# Patient Record
Sex: MALE | Race: OTHER
[De-identification: names, ages, dates, MRNs, and addresses within clinical notes are randomized per-mention and may not be internally consistent; named-entity substitution may affect disease eponyms.]

---

## 2017-01-12 ENCOUNTER — HOSPITAL ENCOUNTER (EMERGENCY)
Dept: HOSPITAL 61 - ER | Age: 42
Discharge: HOME | End: 2017-01-12
Payer: COMMERCIAL

## 2017-01-12 VITALS — WEIGHT: 151 LBS | BODY MASS INDEX: 26.75 KG/M2 | HEIGHT: 63 IN

## 2017-01-12 VITALS — DIASTOLIC BLOOD PRESSURE: 94 MMHG | SYSTOLIC BLOOD PRESSURE: 143 MMHG

## 2017-01-12 DIAGNOSIS — J02.9: Primary | ICD-10-CM

## 2017-01-12 LAB
NEGATIVE OBC STREP: (no result)
POSITIVE OBC STREP: (no result)

## 2017-01-12 PROCEDURE — 87070 CULTURE OTHR SPECIMN AEROBIC: CPT

## 2017-01-12 PROCEDURE — 99283 EMERGENCY DEPT VISIT LOW MDM: CPT

## 2017-01-12 PROCEDURE — 87880 STREP A ASSAY W/OPTIC: CPT

## 2017-01-12 NOTE — PHYS DOC
Past Medical History


Past Medical History:  No Pertinent History


Past Surgical History:  Appendectomy


Alcohol Use:  Occasionally


Drug Use:  None





Adult General


Chief Complaint


Chief Complaint:  SORE THROAT





HPI


HPI


Patient is a 41  year old male who presents with sore throat. Patient reports 

pain started yesterday. He denies fever. He does have a cough. No nausea or 

vomiting. He has tried home remedies such as honey with insufficient relief. No 

other acute complaints.





Review of Systems


Review of Systems


Constitutional: Denies fever or chills 


HENT: Sore throat 


Respiratory: Cough. Denies shortness of breath 


Cardiovascular: Denies chest pain


GI: Denies abdominal pain, nausea, vomiting, bloody stools or diarrhea 


Integument: Denies rash or skin lesions 


Neurologic: Denies headache, focal weakness or sensory changes





Current Medications


Current Medications





 Current Medications








 Medications


  (Trade)  Dose


 Ordered  Sig/Rafia  Start Time


 Stop Time Status Last Admin


Dose Admin


 


 Naproxen


  (Naprosyn)  500 mg  1X  ONCE  1/12/17 02:00


 1/12/17 02:01 DC 1/12/17 02:33


500 MG











Allergies


Allergies





 Allergies








Coded Allergies Type Severity Reaction Last Updated Verified


 


  No Known Drug Allergies    1/12/17 No











Physical Exam


Physical Exam


Constitutional: Well developed, well nourished, no acute distress, non-toxic 

appearance 


HENT: Normocephalic, atraumatic, bilateral external ears normal. Oropharynx 

mildly erythematous without exudate; mild anterior cervical lymphadenopathy


Eyes: EOMI, conjunctiva normal, no discharge


Neck: Normal range of motion, no stridor


Cardiovascular: Heart rate normal, regular rhythm, no murmur 


Lungs & Thorax:  Bilateral breath sounds clear to auscultation


Abdomen: Bowel sounds normal, soft, non-distended, no TTP


Skin: Warm, dry, no erythema, no rash


Extremities: No obvious deformity, no edema


Neurologic: Alert and oriented X 3, no gross deficits noted





Current Patient Data


Vital Signs





 Vital Signs








  Date Time  Temp Pulse Resp B/P Pulse Ox O2 Delivery O2 Flow Rate FiO2


 


1/12/17 02:30  78  143/94    


 


1/12/17 01:38 98.7  16  95 Room Air  





 98.7       











EKG


EKG


[]





Radiology/Procedures


Radiology/Procedures


[]





Course & Med Decision Making


Course & Med Decision Making


Pertinent Labs and Imaging studies reviewed. (See chart for details)


Patient is 41-year-old male who presents with sore throat. Possible viral 

infection, given cough. Rapid strep ordered. Dose of naproxen given for pain 

control. Rapid strep negative. Discussed results with patient and . 

Patient discharged home with instructions for follow-up and return precautions.





Dimple Disclaimer


Dragon Disclaimer


This electronic medical record was generated, in whole or in part, using a 

voice recognition dictation system.





Departure


Departure


Impression:  


 Primary Impression:  


 Pharyngitis


Disposition:  01 HOME, SELF-CARE


Condition:  STABLE


Patient Instructions:  Viral Pharyngitis





Additional Instructions:


Thank you for allowing us to provide care today in the Emergency Department.


Take the provided medication as directed.


Schedule a follow up appointment with your primary care doctor. 


Return promptly to the Emergency Department if you develop any new or 

concerning symptoms.


Scripts


Naproxen 125 Mg/5 Ml Oral.dcfo356 Mg PO BID #120 ML


   Prov:ALPHONSE ADAMS MD         1/12/17








ALPHONSE ADAMS MD Jan 12, 2017 01:46

## 2018-05-22 ENCOUNTER — HOSPITAL ENCOUNTER (EMERGENCY)
Dept: HOSPITAL 61 - ER | Age: 43
Discharge: HOME | End: 2018-05-22
Payer: COMMERCIAL

## 2018-05-22 DIAGNOSIS — R07.2: Primary | ICD-10-CM

## 2018-05-22 DIAGNOSIS — F17.210: ICD-10-CM

## 2018-05-22 DIAGNOSIS — Z82.49: ICD-10-CM

## 2018-05-22 LAB
ADD MAN DIFF?: NO
ALBUMIN SERPL-MCNC: 3.9 G/DL (ref 3.4–5)
ALBUMIN/GLOB SERPL: 1 {RATIO} (ref 1–1.7)
ALP SERPL-CCNC: 97 U/L (ref 46–116)
ALT (SGPT): 70 U/L (ref 16–63)
ANION GAP SERPL CALC-SCNC: 7 MMOL/L (ref 6–14)
AST SERPL-CCNC: 39 U/L (ref 15–37)
BASO #: 0.1 X10^3/UL (ref 0–0.2)
BASO %: 1 % (ref 0–3)
BLOOD UREA NITROGEN: 10 MG/DL (ref 8–26)
BUN/CREAT SERPL: 13 (ref 6–20)
CALCIUM: 9 MG/DL (ref 8.5–10.1)
CHLORIDE: 103 MMOL/L (ref 98–107)
CO2 SERPL-SCNC: 27 MMOL/L (ref 21–32)
CREAT SERPL-MCNC: 0.8 MG/DL (ref 0.7–1.3)
EOS #: 0.2 X10^3/UL (ref 0–0.7)
EOS %: 2 % (ref 0–3)
GFR SERPLBLD BASED ON 1.73 SQ M-ARVRAT: 105 ML/MIN
GLOBULIN SER-MCNC: 3.9 G/DL (ref 2.2–3.8)
GLUCOSE SERPL-MCNC: 97 MG/DL (ref 70–99)
HCG SERPL-ACNC: 9.8 X10^3/UL (ref 4–11)
HEMATOCRIT: 43.6 % (ref 39–53)
HEMOGLOBIN: 14.9 G/DL (ref 13–17.5)
LYMPH #: 3.4 X10^3/UL (ref 1–4.8)
LYMPH %: 35 % (ref 24–48)
MEAN CORPUSCULAR HEMOGLOBIN: 30 PG (ref 25–35)
MEAN CORPUSCULAR HGB CONC: 34 G/DL (ref 31–37)
MEAN CORPUSCULAR VOLUME: 87 FL (ref 79–100)
MONO #: 0.7 X10^3/UL (ref 0–1.1)
MONO %: 8 % (ref 0–9)
NEUT #: 5.4 X10^3UL (ref 1.8–7.7)
NEUT %: 55 % (ref 31–73)
PLATELET COUNT: 195 X10^3/UL (ref 140–400)
POTASSIUM SERPL-SCNC: 4.2 MMOL/L (ref 3.5–5.1)
RED BLOOD COUNT: 5.01 X10^6/UL (ref 4.3–5.7)
RED CELL DISTRIBUTION WIDTH: 13.3 % (ref 11.5–14.5)
SODIUM: 137 MMOL/L (ref 136–145)
TOTAL BILIRUBIN: 0.6 MG/DL (ref 0.2–1)
TOTAL PROTEIN: 7.8 G/DL (ref 6.4–8.2)
TROPONINI: < 0.017 NG/ML (ref 0–0.06)

## 2018-05-22 PROCEDURE — 85025 COMPLETE CBC W/AUTO DIFF WBC: CPT

## 2018-05-22 PROCEDURE — 99285 EMERGENCY DEPT VISIT HI MDM: CPT

## 2018-05-22 PROCEDURE — 71046 X-RAY EXAM CHEST 2 VIEWS: CPT

## 2018-05-22 PROCEDURE — 93005 ELECTROCARDIOGRAM TRACING: CPT

## 2018-05-22 PROCEDURE — 80053 COMPREHEN METABOLIC PANEL: CPT

## 2018-05-22 PROCEDURE — 84484 ASSAY OF TROPONIN QUANT: CPT

## 2018-05-22 PROCEDURE — 36415 COLL VENOUS BLD VENIPUNCTURE: CPT

## 2021-04-08 ENCOUNTER — HOSPITAL ENCOUNTER (EMERGENCY)
Dept: HOSPITAL 61 - ER | Age: 46
Discharge: HOME | End: 2021-04-08
Payer: SELF-PAY

## 2021-04-08 VITALS — DIASTOLIC BLOOD PRESSURE: 91 MMHG | SYSTOLIC BLOOD PRESSURE: 145 MMHG

## 2021-04-08 VITALS — BODY MASS INDEX: 29.86 KG/M2 | HEIGHT: 62 IN | WEIGHT: 162.26 LBS

## 2021-04-08 DIAGNOSIS — Z90.89: ICD-10-CM

## 2021-04-08 DIAGNOSIS — M79.605: ICD-10-CM

## 2021-04-08 DIAGNOSIS — I10: Primary | ICD-10-CM

## 2021-04-08 DIAGNOSIS — M79.604: ICD-10-CM

## 2021-04-08 DIAGNOSIS — Z98.890: ICD-10-CM

## 2021-04-08 DIAGNOSIS — R60.0: ICD-10-CM

## 2021-04-08 PROCEDURE — 99285 EMERGENCY DEPT VISIT HI MDM: CPT

## 2021-04-08 PROCEDURE — 93970 EXTREMITY STUDY: CPT

## 2021-04-08 NOTE — PHYS DOC
Past Medical History


Past Medical History:  No Pertinent History, Hypertension


Past Surgical History:  Appendectomy, Other


Additional Past Surgical Histo:  kidney stones


Smoking Status:  Never Smoker


Alcohol Use:  Occasionally


Drug Use:  None





General Adult


EDM:


Chief Complaint:  LOWER EXTREMITY SWELLING





HPI:


HPI:





Patient is a 45  year old male with a history of hypertension not on any 

medication who presents to the ED today complaining of 5 out of 10 bilateral 

lower extremity pain specifically from the knee to the ankles, pain has been 

going on since yesterday.  Describes the pain as pulling.  States the pain is 

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 

.  Denies any chest pain or shortness of breath.  Denies any injuries.





Patient is Azeri speaking and interpretation was provided by the son





Review of Systems:


Review of Systems:


Constitutional:   Denies fever or chills. []


Eyes:   Denies change in visual acuity. []


HENT:   Denies nasal congestion or sore throat. [] 


Respiratory:   Denies cough or shortness of breath. [] 


Cardiovascular:   Denies chest pain or edema. [] 


GI:   Denies abdominal pain, nausea, vomiting, bloody stools or diarrhea. [] 


:  Denies dysuria. [] 


Musculoskeletal:   Reports bilateral lower extremity pain.  Denies back pain 


Integument:   Denies rash. [] 


Neurologic:   Denies headache, focal weakness or sensory changes. [] 


Psychiatric:  Denies depression or anxiety. []





Heart Score:


C/O Chest Pain:  N/A


Risk Factors:


Risk Factors:  DM, Current or recent (<one month) smoker, HTN, HLP, family 

history of CAD, obesity.


Risk Scores:


Score 0 - 3:  2.5% MACE over next 6 weeks - Discharge Home


Score 4 - 6:  20.3% MACE over next 6 weeks - Admit for Clinical Observation


Score 7 - 10:  72.7% MACE over next 6 weeks - Early Invasive Strategies





Current Medications:





Current Medications








 Medications


  (Trade)  Dose


 Ordered  Sig/Rafia  Start Time


 Stop Time Status Last Admin


Dose Admin


 


 Clonidine HCl


  (Catapres)  0.3 mg  1X  ONCE  4/8/21 15:30


 4/8/21 15:31 DC 4/8/21 15:52


0.3 MG











Allergies:


Allergies:





Allergies








Coded Allergies Type Severity Reaction Last Updated Verified


 


  No Known Drug Allergies    1/12/17 No











Physical Exam:


PE:





Constitutional: Well developed, well nourished, no acute distress, non-toxic 

appearance. []


HENT: Normocephalic, atraumatic, bilateral external ears normal, oropharynx 

moist, no oral exudates, nose normal. []


Eyes: PERRLA, EOMI, conjunctiva normal, no discharge. [] 


Neck: Normal range of motion, no tenderness, supple, no stridor. [] 


Cardiovascular:Heart rate regular rhythm, no murmur []


Lungs & Thorax:  Bilateral breath sounds clear to auscultation []


Abdomen: Bowel sounds normal, soft, no tenderness, no masses, no pulsatile 

masses. [] 


Skin: Warm, dry, no erythema, no rash. [] 


Back: No tenderness, no CVA tenderness. [] 


Extremities: No tenderness, no cyanosis, no clubbing, ROM intact, no edema.  

Negative Homans' sign bilaterally.


Neurologic: Alert and oriented X 3, normal motor function, normal sensory 

function, no focal deficits noted. []


Psychologic: Affect normal, judgement normal, mood normal. []





Current Patient Data:


Vital Signs:





                                   Vital Signs








  Date Time  Temp Pulse Resp B/P (MAP) Pulse Ox O2 Delivery O2 Flow Rate FiO2


 


4/8/21 15:52  80  210/126    


 


4/8/21 13:15 98.1  18  97 Room Air  





 98.1       











EKG:


EKG:


[]





Radiology/Procedures:


Radiology/Procedures:


[]PROCEDURE: VENOUS LOWER EXT BILATERAL





Bilateral lower extremity venous Doppler dated 4/8/2021.





No comparison available.





Clinical data indication: Leg pain.





FINDINGS:





Grayscale, color-flow and spectral waveform analysis performed to include the 

deep venous system of both lower extremity. There is normal compressibility, 

phasicity and augmentation of flow throughout. No filling defects are seen.





IMPRESSION:





No evidence of lower extremity deep vein thrombosis.





Electronically signed by: Ramon Umana MD (4/8/2021 2:25 PM) [id number]














DICTATED and SIGNED BY:     RAMON UMANA MD


DATE:     04/08/21 [id number]





Course & Med Decision Making:


Course & Med Decision Making


Pertinent Labs and Imaging studies reviewed. (See chart for details)





This is a 45-year-old male patient presenting to the ED today complaining of 

lower extremity pain and swelling that began yesterday.  Patient reports 

symptoms are worse at work where he stands as a .  No obvious 

swelling was noted to bilateral lower extremities.  Venous Dopplers of bilateral

 lower extremities are negative.  





Blood pressure 205/136 with a heart rate of 82 on arrival to the ED.  Reports 

history of high blood pressure but does not take any medicine.  Denies any chest

 pain or shortness of breath.





Patient was given clonidine in the ED, BP has come down to 153/90.  I spoke to 

patient and son about his hypertension.  We agreed to start this patient on 

amlodipine and he will follow-up with the PCP.





Dragon Disclaimer:


Dragon Disclaimer:


This electronic medical record was generated, in whole or in part, using a voice

 recognition dictation system.





Departure


Departure


Impression:  


   Primary Impression:  


   Hypertension


   Qualified Codes:  I10 - Essential (primary) hypertension


   Additional Impression:  


   Lower extremity pain


   Qualified Codes:  M79.604 - Pain in right leg; M79.605 - Pain in left leg


Disposition:  01 DC HOME SELF CARE/HOMELESS


Condition:  STABLE


Referrals:  


NO PCP (PCP)


Please follow up with 


Dr. Manuel Hein


[address] [place] [address]


Phone: [phone]


Patient Instructions:  Hypertension, Musculoskeletal Pain





Additional Instructions:  


You were evaluated for lower extremity swelling as well as high blood pressure.


Your blood pressure was high in the emergency room.  Please follow-up with the 

provided primary care doctor soon as possible.  Your ultrasound of the lower 

extremities were negative.  Take the prescribed medications as ordered.


Scripts


Diclofenac Potassium (DICLOFENAC POTASSIUM) 50 Mg Tablet


1 TAB PO BID, #30 TAB 0 Refills


   Prov: HORACIO ROSA         4/8/21 


Amlodipine Besylate (AMLODIPINE BESYLATE) 10 Mg Tablet


10 MG PO DAILY, #30 TAB


   Prov: HORACIO ROSA         4/8/21











HORACIO ROSA             Apr 8, 2021 17:09

## 2021-04-08 NOTE — RAD
Bilateral lower extremity venous Doppler dated 4/8/2021.



No comparison available.



Clinical data indication: Leg pain.



FINDINGS:



Grayscale, color-flow and spectral waveform analysis performed to include the deep venous system of b
[facility] lower extremity. There is normal compressibility, phasicity and augmentation of flow throughout. 
No filling defects are seen.



IMPRESSION:



No evidence of lower extremity deep vein thrombosis.



Electronically signed by: Ramon Umana MD (4/8/2021 2:25 PM) [id number]

## 2022-04-05 ENCOUNTER — HOSPITAL ENCOUNTER (EMERGENCY)
Dept: HOSPITAL 61 - ER | Age: 47
Discharge: HOME | End: 2022-04-05
Payer: COMMERCIAL

## 2022-04-05 VITALS — WEIGHT: 154.1 LBS | BODY MASS INDEX: 28.36 KG/M2 | HEIGHT: 62 IN

## 2022-04-05 VITALS — DIASTOLIC BLOOD PRESSURE: 112 MMHG | SYSTOLIC BLOOD PRESSURE: 176 MMHG

## 2022-04-05 DIAGNOSIS — I10: ICD-10-CM

## 2022-04-05 DIAGNOSIS — Z87.891: ICD-10-CM

## 2022-04-05 DIAGNOSIS — M77.02: Primary | ICD-10-CM

## 2022-04-05 LAB
ANION GAP SERPL CALC-SCNC: 10 MMOL/L (ref 6–14)
BUN SERPL-MCNC: 18 MG/DL (ref 8–26)
CALCIUM SERPL-MCNC: 8.6 MG/DL (ref 8.5–10.1)
CHLORIDE SERPL-SCNC: 101 MMOL/L (ref 98–107)
CO2 SERPL-SCNC: 29 MMOL/L (ref 21–32)
CREAT SERPL-MCNC: 1.1 MG/DL (ref 0.7–1.3)
GFR SERPLBLD BASED ON 1.73 SQ M-ARVRAT: 71.4 ML/MIN
GLUCOSE SERPL-MCNC: 116 MG/DL (ref 70–99)
POTASSIUM SERPL-SCNC: 3.6 MMOL/L (ref 3.5–5.1)
SODIUM SERPL-SCNC: 140 MMOL/L (ref 136–145)

## 2022-04-05 PROCEDURE — 99285 EMERGENCY DEPT VISIT HI MDM: CPT

## 2022-04-05 PROCEDURE — 29125 APPL SHORT ARM SPLINT STATIC: CPT

## 2022-04-05 PROCEDURE — 80048 BASIC METABOLIC PNL TOTAL CA: CPT

## 2022-04-05 PROCEDURE — A4565 SLINGS: HCPCS

## 2022-04-05 PROCEDURE — 36415 COLL VENOUS BLD VENIPUNCTURE: CPT

## 2022-04-05 NOTE — PHYS DOC
Past Medical History


Past Medical History:  No Pertinent History, Hypertension


Additional Past Medical Histor:  DOES NOT TAKE MEDICATION FOR BP. HAS AN 

APPOINTMENT WITH PCP 4/13.


Past Surgical History:  Appendectomy, Other


Additional Past Surgical Histo:  kidney stones


Smoking Status:  Former Smoker


Alcohol Use:  None


Drug Use:  None





Adult General


Chief Complaint


Chief Complaint:  UPPER EXTREMITY PAIN





HPI


HPI


The patient is a 46-year-old male with a history of hypertension not on 

medication and former tobacco abuse.  He quit about 2 years ago.  Patient has 

been seen here in the past for elevated blood pressure and started on amlodipine

from the emergency department but has not followed up with a primary care 

physician about his elevated blood pressures.  He does have an appointment to 

see a primary doctor in the office on the 13th of this month for an initial 

establishment of care appointment.





Mr. Bedoya presents for evaluation of 3 days of atraumatic left arm 

discomfort.  Discomfort localizes to the radial side of the proximal forearm and

the medial epicondyle.  Patient states he works as a  and 

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  

Has not tried any medicine for symptoms.





Blood pressure is notably elevated today without signs or symptoms of acute end 

organ damage.  Patient and his son report that he took the amlodipine he was 

prescribed from this emergency department in 2021 but never followed up after it

ran out.  States he feels entirely well and specifically denies headache, nausea

or vomiting, focal or lateralizing weakness, numbness or tingling, neck 

stiffness/pain/meningismus, vision changes, shortness of breath or chest pain, 

abdominal pain of any kind, oliguria, pain or swelling to arms or legs.





Patient is Latvian speaking and son provides fluent interpretation.  Fluent 

telephonic interpretation was offered but patient and family declined.





Review of Systems


Review of Systems


A 12 point review of systems was completed and was negative except where noted 

in HPI above.





Current Medications


Current Medications





Current Medications








 Medications


  (Trade)  Dose


 Ordered  Sig/Rafia  Start Time


 Stop Time Status Last Admin


Dose Admin


 


 Acetaminophen


  (Tylenol)  1,000 mg  1X  ONCE  4/5/22 20:45


 4/5/22 20:46 DC 4/5/22 20:44


1,000 MG


 


 Amlodipine


 Besylate


  (Norvasc)  10 mg  1X  STAT  4/5/22 20:31


 4/5/22 20:38 DC 4/5/22 20:46


10 MG


 


 Clonidine HCl


  (Catapres)  0.1 mg  1X  ONCE  4/5/22 22:15


 4/5/22 22:16 DC 4/5/22 22:20


0.1 MG


 


 Ibuprofen


  (Motrin)  800 mg  1X  ONCE  4/5/22 21:15


 4/5/22 21:16 DC 4/5/22 21:29


800 MG











Allergies


Allergies





Allergies








Coded Allergies Type Severity Reaction Last Updated Verified


 


  No Known Drug Allergies    1/12/17 No











Physical Exam


Physical Exam


46-year-old male appearing nontoxic and in no acute distress.  Head is 

normocephalic and atraumatic.  Neck is supple and nontender.  Oropharynx is 

moist.  Lungs are clear to auscultation at all stations.  There is a normal S1 

and S2 without rubs or gallops and capillary refill is appropriate, less than 2 

seconds globally.  Abdomen is soft, nontender and nondistended.  No pulsatile 

mass.  Skin is warm and dry without cyanosis, clubbing or edema.  

Psychiatrically, the patient demonstrates appropriate mood and affect and is 

alert.  Evaluation of the extremities reveals BUEs and BLEs neurovascularly 

intact distally with strength out of 5, sensation intact light touch in all 

nerve distributions, radial, DP and PT pulses 2+ equal bilaterally, capillary 

refill less than 2 seconds, hands and feet warm and well-perfused.  No dependent

peripheral edema distally.  No calf tenderness or swelling bilaterally.  Homans 

test is negative bilaterally.  Neurologically, patient moves all extremities 

equally, is alert and oriented x4 no lateralizing deficits are seen.  Evaluation

of the left upper extremity is remarkable for LUE neurovascular intact distally 

with strength of the 5, sensation intact light touch in all nerve distributions,

radial pulse 2+, capillary refill less than 2 seconds, hand warm and well-

perfused.  There is localized tenderness over the medial epicondyle and proximal

wrist flexor muscles.  There is pain with resisted wrist flexion with the elbow 

in full extension and pain with passive wrist extension with the elbow in full 

extension.





Current Patient Data


Vital Signs





                                   Vital Signs








  Date Time  Temp Pulse Resp B/P (MAP) Pulse Ox O2 Delivery O2 Flow Rate FiO2


 


4/5/22 22:39    176/112 (133)    


 


4/5/22 22:21  85 18  96 Room Air  


 


4/5/22 20:14 98.5       





 98.5       








Lab Values





                                Laboratory Tests








Test


 4/5/22


20:36


 


Sodium Level


 140 mmol/L


(136-145)


 


Potassium Level


 3.6 mmol/L


(3.5-5.1)


 


Chloride Level


 101 mmol/L


()


 


Carbon Dioxide Level


 29 mmol/L


(21-32)


 


Anion Gap 10 (6-14)  


 


Blood Urea Nitrogen


 18 mg/dL


(8-26)


 


Creatinine


 1.1 mg/dL


(0.7-1.3)


 


Estimated GFR


(Cockcroft-Gault) 71.4  





 


Glucose Level


 116 mg/dL


(70-99)  H


 


Calcium Level


 8.6 mg/dL


(8.5-10.1)





                                Laboratory Tests


4/5/22 20:36














EKG


EKG


[]





Course & Med Decision Making


Course & Med Decision Making


Clinical scenario is compatible with medial epicondylitis, likely from 

repetitive use at work.  Plan to address patient's chief complaint is cock-up 

splint, sling and referral to orthopedics for close follow-up in the office.  

Will address elevated blood pressure with medicine as per flowsheet but 

hypertension is chronic and longstanding and there is no evidence of end organ 

damage today.  Will check basic chemistries to ensure renal function is 

appropriate for use of NSAIDs at home and if so will plan for an NSAID course at

 discharge.  We will plan to prescribe a bridge course of blood pressure 

medication to get patient to his establishment appointment with his primary 

doctor in a week.  Patient understands and agrees.





2243: Patient has been observed uneventfully here for some time here in the 

emergency department.  Blood pressure has come down significantly.  It remains 

elevated but as above there are no signs or symptoms of acute end organ damage 

today, patient has longstanding problems with uncontrolled hypertension, and he 

does have a close follow-up appointment scheduled with a new primary doctor in 

the office in about a week.  Renal panel unremarkable, as are rest of basic 

chemistries.  Will prescribe 10 mg amlodipine daily and 600 mg of ibuprofen 

every 6 hours on a schedule for the next few days.  Patient is to rest, ice and 

elevate.  Will provide Velcro cock-up splint and sling for comfort.  Will refer 

to Dr. Chand of orthopedics for close follow-up in the office.  Patient and his 

son understand that if he feels worse instead of better or develops other new 

symptoms of concern that he should return to the emergency department right away

 for reevaluation.  Questions are answered.





Dragon Disclaimer


Dragon Disclaimer


This electronic medical record was generated, in whole or in part, using a voice

 recognition dictation system.





Departure


Departure


Impression:  


   Primary Impression:  


   Medial epicondylitis, left elbow


   Additional Impression:  


   Benign essential hypertension


Disposition:  01 HOME / SELF CARE / HOMELESS


Condition:  IMPROVED


Referrals:  


EDVIN CHAND MD


Patient Instructions:  Epicondylitis, Medial (Golfer's Elbow) with Rehab-

[facility], Hypertension





Additional Instructions:  


Follow-up very closely with your primary care doctor in the office in the next 1

 week as already scheduled for a reevaluation of your symptoms and a discussion 

of next best steps in care.  Your blood pressure was elevated today in the 

emergency department.  We have prescribed a blood pressure medication called 

amlodipine which you should take once daily as prescribed to help your blood 

pressure.  Please make sure to have your blood pressure readdressed by your 

primary doctor at your upcoming appointment.  Wear the Velcro splint and sling 

for comfort and support although it is okay to take off the splint to shower and

 to take off the sling when you are working.  Take a 600 mg ibuprofen pill every

 6 hours for discomfort.  Suggest you take ibuprofen every 6 hours on a schedule

 for the next 5 days, and then every 6 hours as needed after that.  Rest, ice 

and elevate your arm.  Return to the emergency department right away for 

worsening symptoms of any kind or with any other new symptoms of concern.





We are referring you to Dr. Chand of orthopedics ([facility]) 

for further evaluation and treatment of your arm discomfort.  Please call her 

office at the telephone number provided to make an appointment to be seen within

 the next 1 week.  Call tomorrow.


Scripts


Ibuprofen (IBUPROFEN) 600 Mg Tablet


600 MG PO PRN Q6HRS PRN for PAIN, #40 TAB


   take with food or milk


   Prov: SHIVA BATISTA MD         4/5/22 


Amlodipine Besylate (AMLODIPINE BESYLATE) 10 Mg Tablet


10 MG PO DAILY, #30 TAB


   Prov: SHIVA BATISTA MD         4/5/22





Problem Qualifiers











SHIVA BATISTA MD                 Apr 5, 2022 21:30